# Patient Record
Sex: FEMALE | NOT HISPANIC OR LATINO | URBAN - METROPOLITAN AREA
[De-identification: names, ages, dates, MRNs, and addresses within clinical notes are randomized per-mention and may not be internally consistent; named-entity substitution may affect disease eponyms.]

---

## 2018-01-02 ENCOUNTER — IMPORTED ENCOUNTER (OUTPATIENT)
Dept: URBAN - METROPOLITAN AREA CLINIC 1 | Facility: CLINIC | Age: 23
End: 2018-01-02

## 2018-01-02 PROBLEM — H52.13: Noted: 2018-01-02

## 2018-01-02 PROCEDURE — S0621 ROUTINE OPHTHALMOLOGICAL EXA: HCPCS

## 2018-01-02 NOTE — PATIENT DISCUSSION
1. Myopia: Rx was given for correction if indicated and requested. 2. Return for an appointment in 1 year for 41 and Contact lens check. with Dr. Roberto Avalos.

## 2020-10-20 ENCOUNTER — IMPORTED ENCOUNTER (OUTPATIENT)
Dept: URBAN - METROPOLITAN AREA CLINIC 1 | Facility: CLINIC | Age: 25
End: 2020-10-20

## 2020-10-20 PROBLEM — H52.13: Noted: 2020-10-20

## 2020-10-20 PROCEDURE — S0621 ROUTINE OPHTHALMOLOGICAL EXA: HCPCS

## 2020-10-20 NOTE — PATIENT DISCUSSION
1. Myopia: Rx was given for correction if indicated and requested. Final rx for CL given. Return for an appointment in 1 year for 41 and Contact lens check. with Dr. Mary Vasquez.

## 2022-04-02 ASSESSMENT — TONOMETRY
OD_IOP_MMHG: 13
OS_IOP_MMHG: 12
OD_IOP_MMHG: 13
OS_IOP_MMHG: 13

## 2022-04-02 ASSESSMENT — VISUAL ACUITY
OS_SC: 20/20-2
OD_SC: 20/20-2
OD_SC: 20/20
OS_CC: J1+
OD_CC: J1+
OS_SC: 20/20-2
OS_CC: J1+
OD_CC: J1+